# Patient Record
Sex: FEMALE
[De-identification: names, ages, dates, MRNs, and addresses within clinical notes are randomized per-mention and may not be internally consistent; named-entity substitution may affect disease eponyms.]

---

## 2019-08-27 PROBLEM — Z00.00 ENCOUNTER FOR PREVENTIVE HEALTH EXAMINATION: Status: ACTIVE | Noted: 2019-08-27

## 2019-09-05 ENCOUNTER — APPOINTMENT (OUTPATIENT)
Dept: OTOLARYNGOLOGY | Facility: CLINIC | Age: 24
End: 2019-09-05
Payer: COMMERCIAL

## 2019-09-05 DIAGNOSIS — H69.80 OTHER SPECIFIED DISORDERS OF EUSTACHIAN TUBE, UNSPECIFIED EAR: ICD-10-CM

## 2019-09-05 DIAGNOSIS — Z83.3 FAMILY HISTORY OF DIABETES MELLITUS: ICD-10-CM

## 2019-09-05 DIAGNOSIS — H93.299 OTHER ABNORMAL AUDITORY PERCEPTIONS, UNSPECIFIED EAR: ICD-10-CM

## 2019-09-05 PROCEDURE — 92557 COMPREHENSIVE HEARING TEST: CPT

## 2019-09-05 PROCEDURE — 31575 DIAGNOSTIC LARYNGOSCOPY: CPT

## 2019-09-05 PROCEDURE — 99203 OFFICE O/P NEW LOW 30 MIN: CPT | Mod: 25

## 2019-09-05 PROCEDURE — 92567 TYMPANOMETRY: CPT

## 2019-09-05 RX ORDER — OXYMETAZOLINE HYDROCHLORIDE 0.05 G/100ML
0.05 SPRAY NASAL
Qty: 30 | Refills: 0 | Status: ACTIVE | COMMUNITY
Start: 2019-05-09

## 2019-09-05 RX ORDER — AMOXICILLIN AND CLAVULANATE POTASSIUM 875; 125 MG/1; MG/1
875-125 TABLET, COATED ORAL
Qty: 20 | Refills: 0 | Status: ACTIVE | COMMUNITY
Start: 2019-03-18

## 2019-09-05 RX ORDER — LORATADINE, PSEUDOEPHEDRINE SULFATE 5; 120 MG/1; MG/1
5-120 TABLET, FILM COATED, EXTENDED RELEASE ORAL
Qty: 14 | Refills: 0 | Status: ACTIVE | COMMUNITY
Start: 2019-05-09

## 2019-09-05 RX ORDER — FLUTICASONE PROPIONATE 50 UG/1
50 SPRAY, METERED NASAL
Qty: 1 | Refills: 3 | Status: ACTIVE | COMMUNITY
Start: 2019-09-05 | End: 1900-01-01

## 2019-09-06 NOTE — HISTORY OF PRESENT ILLNESS
[de-identified] : This is an initial office visit for this woman who was referred in consultation by her primary care physician.\par Her chief complaint is "ears feel full after ear infection in both ears".\par She reports that in March of 2019 she had a "double ear infection".\par She was evaluated and treated at that time for otitis media.\par The acute pain resolved but since that time she has had a sensation of congestion in her ears bilaterally\par She does not complain of tinnitus.\par She does not have a history of chronic ear problems though might have had some ear infections a child.\par \par She was treated with a three-day trial of Afrin which did not help her.\par She was also recommended Claritin-D which she did not take at the time.

## 2019-09-06 NOTE — ASSESSMENT
[FreeTextEntry1] : Clinically she has a normal exam. There is no evidence of fluid behind her middle ears. An R. Blair demonstrated normal hearing with normal tympanograms.\par We discussed the pathophysiology of her problem which I believe his eustachian tube dysfunction.\par I'm going to give her a 4-6 week trial of fluticasone. I've also recommended she take Claritin-D for the next few weeks. She will follow up with me at that time if the problem persists or progresses.